# Patient Record
Sex: FEMALE | Race: WHITE
[De-identification: names, ages, dates, MRNs, and addresses within clinical notes are randomized per-mention and may not be internally consistent; named-entity substitution may affect disease eponyms.]

---

## 2017-11-16 ENCOUNTER — HOSPITAL ENCOUNTER (EMERGENCY)
Dept: HOSPITAL 80 - FED | Age: 32
Discharge: HOME | End: 2017-11-16
Payer: MEDICAID

## 2017-11-16 VITALS
DIASTOLIC BLOOD PRESSURE: 74 MMHG | OXYGEN SATURATION: 99 % | HEART RATE: 78 BPM | TEMPERATURE: 98.1 F | SYSTOLIC BLOOD PRESSURE: 122 MMHG

## 2017-11-16 VITALS — RESPIRATION RATE: 18 BRPM

## 2017-11-16 DIAGNOSIS — E86.9: ICD-10-CM

## 2017-11-16 DIAGNOSIS — Z91.040: ICD-10-CM

## 2017-11-16 DIAGNOSIS — R53.83: ICD-10-CM

## 2017-11-16 DIAGNOSIS — F17.210: ICD-10-CM

## 2017-11-16 DIAGNOSIS — R30.0: ICD-10-CM

## 2017-11-16 DIAGNOSIS — K11.20: Primary | ICD-10-CM

## 2017-11-16 LAB
% IMMATURE GRANULYOCYTES: 0.3 % (ref 0–1.1)
ABSOLUTE IMMATURE GRANULOCYTES: 0.03 10^3/UL (ref 0–0.1)
ABSOLUTE NRBC COUNT: 0 10^3/UL (ref 0–0.01)
ADD DIFF?: NO
ADD MORPH?: NO
ADD SCAN?: NO
ANION GAP SERPL CALC-SCNC: 14 MEQ/L (ref 8–16)
ATYPICAL LYMPHOCYTE FLAG: 0 (ref 0–99)
BACTERIA #/AREA URNS HPF: (no result) /HPF
CALCIUM SERPL-MCNC: 10 MG/DL (ref 8.5–10.4)
CHLORIDE SERPL-SCNC: 104 MEQ/L (ref 97–110)
CO2 SERPL-SCNC: 22 MEQ/L (ref 22–31)
COLOR UR: YELLOW
CREAT SERPL-MCNC: 0.6 MG/DL (ref 0.6–1)
ERYTHROCYTE [DISTWIDTH] IN BLOOD BY AUTOMATED COUNT: 13.3 % (ref 11.5–15.2)
FRAGMENT RBC FLAG: 0 (ref 0–99)
GFR SERPL CREATININE-BSD FRML MDRD: > 60 ML/MIN/{1.73_M2}
GLUCOSE SERPL-MCNC: 103 MG/DL (ref 70–100)
HCT VFR BLD CALC: 40.3 % (ref 38–47)
HGB BLD-MCNC: 14.1 G/DL (ref 12.6–16.3)
LEFT SHIFT FLG: 0 (ref 0–99)
LIPEMIA HEMOLYSIS FLAG: 90 (ref 0–99)
MCH RBC BLDCO QN: 32.3 PG (ref 27.9–34.1)
MCHC RBC AUTO-ENTMCNC: 35 G/DL (ref 32.4–36.7)
MCV RBC AUTO: 92.4 FL (ref 81.5–99.8)
MUCOUS THREADS #/AREA URNS LPF: (no result) /LPF
NITRITE UR QL STRIP: NEGATIVE
NRBC-AUTO%: 0 % (ref 0–0.2)
PH UR STRIP: 5 [PH] (ref 5–7.5)
PLATELET # BLD: 390 10^3/UL (ref 150–400)
PLATELET CLUMPS FLAG: 0 (ref 0–99)
PMV BLD AUTO: 9.2 FL (ref 8.7–11.7)
POTASSIUM SERPL-SCNC: 4 MEQ/L (ref 3.5–5.2)
RBC # BLD AUTO: 4.36 10^6/UL (ref 4.18–5.33)
RBC #/AREA URNS HPF: (no result) /HPF (ref 0–3)
SODIUM SERPL-SCNC: 140 MEQ/L (ref 134–144)
SP GR UR STRIP: 1.03 (ref 1–1.03)
WBC #/AREA URNS HPF: (no result) /HPF (ref 0–3)

## 2017-11-16 NOTE — EDPHY
H & P


Stated Complaint: bilat neck and bilat jaw pain & gen abd pain since woke up 

this morning


HPI/ROS: 





CHIEF COMPLAINT: Multiple complaints





HISTORY OF PRESENT ILLNESS: The patient is a 31 y/o female with a history of 

depression arriving at the referral of her PCP for admission with concern for 

meningitis or autoimmune disease per patient. Over a month ago she developed 

bilateral jaw pain (and was diagnosed with sialoadenitis) and subsequently 

chest pain. Over the weekend she developed upper abdominal pain and a fever as 

high as 103F. Her abdominal pain felt "like I got hit in the stomach with a 

shovel." She was evaluated at urgent care on 11/11 for this and had a normal abd

/pelvis CT. She had an elevated white blood cell count at that visit. She has 

since developed pain radiating into both flanks and neck pain and stiffness. 

She also vomited on Monday and says she has not been eating or drinking and has 

had difficulty urinating and reports she lost 10lbs in the last week. She says 

her WBC has been increasing. She additionally complains of a posterior headache 

and baseline smoker's cough. She denies dysuria. She got a flu vaccination this 

season, but says she had an allergic reaction to it that required admission at 

[facility]. She saw her PCP today for these symptoms and was referred to the ED.





REVIEW OF SYSTEMS:





A ten point review of systems was performed and is negative with the exception 

of the items mentioned in the HPI.





Past medical history: Depression - fluoxetine





Past surgical history: teeth pulled in September





Family history: Noncontributory





Social history: 4-5 cigarettes per day smoker. Family at bedside. PCP: ALEX Mario





General Appearance:  Alert.  Vital signs reviewed.  Heart rate 112 at triage, 

blood pressure 139/89.


Eyes:  Pupils equal and round, no conjunctival injection, no discharge. 

Anicteric.


Face: Bilateral maxillary sinus tenderness


ENT, Mouth:  Mucous membranes are moist, no oropharyngeal erythema or edema.


Neck:  No lymphadenopathy, supple. No meningismus.


Respiratory:  Lungs are clear to auscultation; no wheezes, rales, or rhonchi.


Cardiovascular:  Regular rate and rhythm; no murmur, rub, or gallop.


Gastrointestinal:  Abdomen is soft and nontender, no masses or organomegaly, 

bowel sounds normal.


Skin:  Warm and dry, no rashes on exposed skin, normal color.


Back:  Nontender to palpation over the thoracolumbar spine. No CVAT.


Extremities:  No lower extremity edema, no calf tenderness or swelling. Bruise 

over right anterior tibial area.


Neurological:  Alert and oriented.  Moving all four extremities easily and 

equally.


Cranial nerves II through XII are examined and are intact (visual acuity not 

tested).  Strength is 5 over 5 bilaterally with testing of all major motor 

groups.  Sensation is intact to light touch over all 4 extremities.  Deep 

tendon reflexes are 2+ in the biceps and knees bilaterally.  Gait is normal.  

Finger-to-nose is performed accurately.


Psychiatric:  Normal affect.





- Personal History


LMP (Females 10-55): 8-14 Days Ago


Current Tetanus/Diphtheria Vaccine: Unsure


Current Tetanus Diphtheria and Acellular Pertussis (TDAP): Unsure


Tetanus Vaccine Date: 2012





- Medical/Surgical History


Hx Asthma: No


Hx Chronic Respiratory Disease: No


Hx Diabetes: No


Hx Cardiac Disease: No


Hx Renal Disease: No


Hx Cirrhosis: No


Hx Alcoholism: No


Hx HIV/AIDS: No


Hx Splenectomy or Spleen Trauma: No


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 

(injury about 2006)





- Social History


Smoking Status: Current every day smoker


Constitutional: 


 Initial Vital Signs











Temperature (C)  37.5 C   11/16/17 16:00


 


Heart Rate  112 H  11/16/17 16:00


 


Respiratory Rate  18   11/16/17 16:00


 


Blood Pressure  139/98 H  11/16/17 16:00


 


O2 Sat (%)  98   11/16/17 16:00








 











O2 Delivery Mode               Room Air














Allergies/Adverse Reactions: 


 





latex [Latex] Allergy (Verified 08/20/14 10:25)


 


prednisolone Allergy (Verified 11/16/17 15:58)


 








Home Medications: 














 Medication  Instructions  Recorded


 


Prozac 10 MG (RX)  08/20/14














Medical Decision Making


ED Course/Re-evaluation: 


This is a 31 y/o female who presents with multiple complaints over the last 

month and requesting admission to be worked up for meningitis or autoimmune 

disease. Her symptoms include jaw pain, chest pain, abdominal pain, flank pain, 

neck pain and stiffness, fatigue, weight loss, vomiting, difficulty eating/

drinking, and fever. It sounds like she has had extensive work up with her PCP 

and others for these symptoms. She is afebrile here. My suspicion for bacterial 

meningitis is low as she has had these symptoms for weeks to months and has 

been afebrile since arriving here. She has no meningeal signs on exam. I 

offered lumbar puncture to evaluate for viral meningitis. Plan for IV, flu swab

, UA, and labs. 1L IV NS and 30mg IV Toradol administered. 





1907: Reevaluated patient. Labs are unremarkable. She has been unable to 

provide a urine sample yet. She tells me this is her 3rd ED visit in two weeks 

for the same issues. Her  who is now at bedside is wondering if her 

symptoms could be related to getting multiple teeth pulled back in September 

since her symptoms began around that time. She had dental x-rays last week and 

was told she had calcium blockages in her salivary glands and was referred to 

an oral surgeon. I examined her mouth and do not see signs of dental infection 

nor can I elicit dental tenderness. 





1917: Note from ALEX Mario's office regarding patient's visit this morning 

references sending patient to the ED for "stat testing" without details on what 

testing she believes patient needs. 





2025: Patient's UA has been sent for culture,--urinalysis shows 2-5 white blood 

cells 1+ bacteria and 2+ epithelial cells, making me think this is not a true 

clean-catch.  I do not think that a urinary tract infection is the explanation 

for her symptoms.





As mentioned above, I do not think that she has meningeal signs and do not 

suspect a bacterial meningitis.  Viral meningitis is possible certainly, but 

she does not have fever.  I doubt that a lumbar puncture would be helpful in 

figuring this out. 





2038: Reevaluated patient and discussed work up. I have not found an obvious 

cause for all of her symptoms. She can try OTC Pepcid for her GI symptoms. I've 

recommended following up with an ENT regarding the salivary stone she mentioned 

and returning to her PCP for any further immunologic work up as we do not have 

the capability to perform these tests in the ED. Return precautions discussed. 

She agrees with plan for discharge. 





- Data Points


Laboratory Results: 


 Laboratory Results





 11/16/17 16:50 





 11/16/17 16:50 








Microbiology Results: 


 MICROBIOLOGY





11/16/17 20:00   Urine,Clean Catch   Urine Culture - Final


                            [place] Types





Medications Given: 


 








Discontinued Medications





Sodium Chloride (Ns)  1,000 mls @ 0 mls/hr IV EDNOW ONE; Wide Open


   PRN Reason: Protocol


   Stop: 11/16/17 17:29


   Last Admin: 11/16/17 17:34 Dose:  1,000 mls


Ketorolac Tromethamine (Toradol)  30 mg IM EDNOW ONE


   Stop: 11/16/17 17:29


   Last Admin: 11/16/17 17:35 Dose:  30 mg


Ondansetron HCl (Zofran)  4 mg IVP EDNOW ONE


   Stop: 11/16/17 18:53


   Last Admin: 11/16/17 19:04 Dose:  4 mg








Departure





- Departure


Disposition: Home, Routine, Self-Care


Clinical Impression: 


 Jaw pain, Difficulty urinating, Fatigue, Sialoadenitis





Condition: Good


Instructions:  Famotidine (By mouth), Sialoadenitis (ED), Fatigue (ED)


Additional Instructions: 


1. Please follow up with ENT next week. 


2. Try over-the-counter acid blockers like Pepcid to treat some of your GI 

symptoms. 


3. Increase fluid intake.


4. Follow up with your primary care provider as needed.


5. Return to the ED for worsening of condition. 


Referrals: 


Kita Mario PA [Primary Care Provider] - As per Instructions


Shameka Braswell MD [Medical Doctor] - As per Instructions


Report Scribed for: Génesis Arriaga


Report Scribed by: Luiza Lugo


Date of Report: 11/16/17


Time of Report: 16:38


Physician Review and Approval Statement: 





11/16/17 16:35


Portions of this note were transcribed by the medical scribe.  I, Dr. Génesis Arriaga, personally performed the history, physical exam, and medical decision-

making; and confirmed the accuracy of the information in the transcribed note.

## 2018-05-07 ENCOUNTER — HOSPITAL ENCOUNTER (OUTPATIENT)
Dept: HOSPITAL 80 - FIMAGING | Age: 33
End: 2018-05-07
Attending: PHYSICIAN ASSISTANT
Payer: MEDICAID

## 2018-05-07 DIAGNOSIS — M54.2: ICD-10-CM

## 2018-05-07 DIAGNOSIS — R68.84: Primary | ICD-10-CM

## 2018-05-07 DIAGNOSIS — R22.0: ICD-10-CM

## 2018-05-07 PROCEDURE — A9585 GADOBUTROL INJECTION: HCPCS

## 2018-05-23 ENCOUNTER — HOSPITAL ENCOUNTER (OUTPATIENT)
Dept: HOSPITAL 80 - FIMAGING | Age: 33
End: 2018-05-23
Attending: PHYSICIAN ASSISTANT
Payer: MEDICAID

## 2018-05-23 DIAGNOSIS — R68.84: Primary | ICD-10-CM
